# Patient Record
Sex: FEMALE | Race: WHITE
[De-identification: names, ages, dates, MRNs, and addresses within clinical notes are randomized per-mention and may not be internally consistent; named-entity substitution may affect disease eponyms.]

---

## 2017-09-15 ENCOUNTER — HOSPITAL ENCOUNTER (OUTPATIENT)
Dept: HOSPITAL 47 - LABWHC1 | Age: 22
Discharge: HOME | End: 2017-09-15
Payer: COMMERCIAL

## 2017-09-15 DIAGNOSIS — R07.89: Primary | ICD-10-CM

## 2017-09-15 PROCEDURE — 93005 ELECTROCARDIOGRAM TRACING: CPT

## 2017-09-15 PROCEDURE — 36415 COLL VENOUS BLD VENIPUNCTURE: CPT

## 2019-08-27 ENCOUNTER — HOSPITAL ENCOUNTER (EMERGENCY)
Dept: HOSPITAL 47 - EC | Age: 24
Discharge: HOME | End: 2019-08-27
Payer: COMMERCIAL

## 2019-08-27 VITALS — DIASTOLIC BLOOD PRESSURE: 90 MMHG | HEART RATE: 82 BPM | RESPIRATION RATE: 17 BRPM | SYSTOLIC BLOOD PRESSURE: 144 MMHG

## 2019-08-27 DIAGNOSIS — Z77.21: Primary | ICD-10-CM

## 2019-08-27 DIAGNOSIS — F17.200: ICD-10-CM

## 2019-08-27 PROCEDURE — 99283 EMERGENCY DEPT VISIT LOW MDM: CPT

## 2019-08-27 NOTE — ED
General Adult HPI





- General


Chief complaint: [place]lestick/Exposure


Stated complaint: [facility] blood draw


Time Seen by Provider: 08/27/19 19:34


Source: patient, RN notes reviewed, old records reviewed


Mode of arrival: ambulatory


Limitations: no limitations





- History of Present Illness


Initial comments: 


24 year old female patient reasons to ED with occupational exposure of blood 

splash to eye.  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 

the eye with blood. Patient denies any other complaints at this time.  Patient 

reports that she irrigated eye after exposure.





Systemic: Pt denies fatigue, fever/chills, rash. Pt denies weakness, night 

sweats, weight loss. 


Neuro: Pt denies headache, visual disturbances, syncope or pre-syncope.


HEENT: Pt denies ocular discharge or irritation, otalgia, rhinorrhea, 

pharyngitis or notable lymphadenopathy. 


Cardiopulmonary: Pt denies chest pain, SOB, heart palpitations, dyspnea on 

exertion.  


Abdominal/GI: Pt denies abdominal pain, n/v/d. 


: Pt denies dysuria, burning w/ urination, frequency/urgency. Denies new onset

urinary or bowel incontinence.  


MSK: Pt denies myalgia, loss of strength or function in extremities. 


Neuro: Pt denies new onset weakness, paresthesias. 








Review of Systems


ROS Statement: 


Those systems with pertinent positive or pertinent negative responses have been 

documented in the HPI.





ROS Other: All systems not noted in ROS Statement are negative.





Past Medical History


Past Medical History: No Reported History


History of Any Multi-Drug Resistant Organisms: None Reported


Past Surgical History: No Surgical Hx Reported


Past Psychological History: No Psychological Hx Reported


Smoking Status: Current every day smoker


Past Alcohol Use History: None Reported


Past Drug Use History: None Reported





General Exam





- General Exam Comments


Initial Comments: 





Constitutional: NAD, AOX3, Pt has pleasant affect. 


HEENT: NC/AT, trachea midline, neck supple, no lymphadenopathy. Posterior 

pharynx non erythematous, without exudates. External ears appear normal, without

discharge. Mucous membranes moist. Eyes PERRLA, EOM intact. There is no scleral 

icterus. No pallor noted. 


Cardiopulmonary: RRR, no murmurs, rubs or gallops, no JVD noted. Lungs CTAB in 

anterior and posterior fields. No peripheral edema. 


Abdominal exam: Abdomen soft and non-distended. Abdomen non-tender to palpation 

in all 4 quadrants. Bowel sounds active in LLQ. No hepatosplenomegaly. No 

ecchymosis


Neuro: CN II-XII grossly intact. No nuchal rigidity. No raccon eyes, no levy 

sign, no hemotympanum. No cervical spinal tenderness. 


MSK: No posterior calf tenderness bilaterally, homans sign negative bilaterally.

Posterior tibialis and radial pulse +2 bilaterally. Sensation intact in upper 

and lower extremities. Full active ROM in upper and lower extremities, 5/5 

stregnth. 





Limitations: no limitations





Course


                                   Vital Signs











  08/27/19





  19:48


 


Pulse Rate 82


 


Respiratory 17





Rate 


 


Blood Pressure 144/90


 


O2 Sat by Pulse 99





Oximetry 














Medical Decision Making





- Medical Decision Making








24 year old female patient reasons to ED with occupational exposure of blood to 

eye.  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 

with blood. Patient denies any other complaints at this time. Pt irrigated eye 

after exposure. Pt VSS, afebrile. Pt declined hiv prophylaxis. Pt contacted with

rapid HIV results which were negative. Case discussed with Dr. Wilson. 








Disposition


Clinical Impression: 


 Exposure to blood





Disposition: HOME SELF-CARE


Condition: Stable


Additional Instructions: 


Patient to adhere to previously discussed treatment plan and will take 

medication(s) as directed. Patient to follow up with PCP in 1-2 days. Patient to

return to ED if symptoms do not improve. 


Is patient prescribed a controlled substance at d/c from ED?: No


Referrals: 


Nilton Guzman DO [Primary Care Provider] - 1-2 days

## 2023-05-04 ENCOUNTER — HOSPITAL ENCOUNTER (OUTPATIENT)
Dept: HOSPITAL 47 - RADMAMWWP | Age: 28
Discharge: HOME | End: 2023-05-04
Attending: OBSTETRICS & GYNECOLOGY
Payer: COMMERCIAL

## 2023-05-04 DIAGNOSIS — R92.8: Primary | ICD-10-CM

## 2023-05-04 PROCEDURE — 77066 DX MAMMO INCL CAD BI: CPT

## 2023-05-04 PROCEDURE — 77062 BREAST TOMOSYNTHESIS BI: CPT

## 2023-05-04 NOTE — USB
Reason for Exam: Clinical finding. 





Patient History: 

Menarche at age 11. Patient has no children. Currently using Hormonal Contraceptives, starting at

age 21. 





Findings: 

The upper section of the breast of the right breast, the upper inner quadrant of the left breast,

the area of palpable concern of both breasts, the axilla of both breasts and the retroareolar of

both breasts were scanned.

Targeted ultrasound at area of concern bilaterally.  No solid or cystic masses are identified.

Scanning of the bilateral axilla and subareolar region shows no suspicious mass or adenopathy. 





Overall Assessment: Negative, BI-RAD 1





Management: 

Screening Mammogram of both breasts at age 38.

Managed clinically patient's symptoms of bilateral palpable abnormalities.  Results were given to

the patient verbally at the time of exam.



Electronically signed and approved by: Drew Nichols M.D.

## 2023-05-04 NOTE — MM
Reason for Exam: Clinical finding. 

Baseline mammogram.

Indicated Problems: 

Lump or thickening of both sides for 1 Week(s).





Patient History: 

Menarche at age 11. Patient has no children. Currently using Hormonal Contraceptives, starting at

age 21.

Last menstrual period: 04/18/2023





Prior Study Comparison: 

Patient's first Mammogram. 





Tissue Density: 

The breast tissue is extremely dense which could obscure a lesion on mammography.





Findings: 

Analyzed By CAD. 

No obvious distortion or suspicious group of microcalcifications bilaterally. 





Overall Assessment: Incomplete: need additional imaging evaluation, BI-RAD 0





Management: 

Diagnostic Breast Ultrasound of both breasts.

Targeted ultrasound bilateral breasts palpable lumps.  Results were given to the patient verbally at

the time of exam.



Patient should continue monthly self-breast exams.  A clinical breast exam by your physician is

recommended on an annual basis.

This exam should not preclude additional follow-up of suspicious palpable abnormalities.





Note on Prerna scores and lifetime risk:

1. A Prerna score greater than 3% is considered moderate risk. If this is the case, consider

specialist referral to assess eligibility for a risk reducing agent.

2. If overall lifetime risk for the development of breast cancer is 20% or higher, the patient may

qualify for future screening with alternating mammogram and breast MRI.



Electronically signed and approved by: Drew Nichols M.D.